# Patient Record
Sex: FEMALE | Race: WHITE | NOT HISPANIC OR LATINO | Employment: OTHER | URBAN - METROPOLITAN AREA
[De-identification: names, ages, dates, MRNs, and addresses within clinical notes are randomized per-mention and may not be internally consistent; named-entity substitution may affect disease eponyms.]

---

## 2021-05-25 ENCOUNTER — RECORDS - HEALTHEAST (OUTPATIENT)
Dept: ADMINISTRATIVE | Facility: CLINIC | Age: 71
End: 2021-05-25

## 2021-05-27 ENCOUNTER — RECORDS - HEALTHEAST (OUTPATIENT)
Dept: ADMINISTRATIVE | Facility: CLINIC | Age: 71
End: 2021-05-27

## 2022-05-06 ENCOUNTER — HOSPITAL ENCOUNTER (EMERGENCY)
Facility: CLINIC | Age: 72
Discharge: ANOTHER HOSPITAL | End: 2022-05-06
Attending: STUDENT IN AN ORGANIZED HEALTH CARE EDUCATION/TRAINING PROGRAM | Admitting: STUDENT IN AN ORGANIZED HEALTH CARE EDUCATION/TRAINING PROGRAM
Payer: COMMERCIAL

## 2022-05-06 VITALS
DIASTOLIC BLOOD PRESSURE: 85 MMHG | TEMPERATURE: 98.5 F | WEIGHT: 149 LBS | HEART RATE: 60 BPM | OXYGEN SATURATION: 94 % | SYSTOLIC BLOOD PRESSURE: 173 MMHG | RESPIRATION RATE: 16 BRPM

## 2022-05-06 DIAGNOSIS — H57.11 EYE PAIN, RIGHT: ICD-10-CM

## 2022-05-06 PROCEDURE — 99285 EMERGENCY DEPT VISIT HI MDM: CPT | Performed by: STUDENT IN AN ORGANIZED HEALTH CARE EDUCATION/TRAINING PROGRAM

## 2022-05-06 RX ORDER — TETRACAINE HYDROCHLORIDE 5 MG/ML
1-2 SOLUTION OPHTHALMIC ONCE
Status: DISCONTINUED | OUTPATIENT
Start: 2022-05-06 | End: 2022-05-06 | Stop reason: CLARIF

## 2022-05-07 ENCOUNTER — HOSPITAL ENCOUNTER (EMERGENCY)
Facility: CLINIC | Age: 72
Discharge: HOME | End: 2022-05-07
Attending: EMERGENCY MEDICINE | Admitting: EMERGENCY MEDICINE
Payer: COMMERCIAL

## 2022-05-07 ENCOUNTER — TELEPHONE (OUTPATIENT)
Dept: OPHTHALMOLOGY | Facility: CLINIC | Age: 72
End: 2022-05-07
Payer: COMMERCIAL

## 2022-05-07 VITALS
HEART RATE: 62 BPM | OXYGEN SATURATION: 96 % | SYSTOLIC BLOOD PRESSURE: 149 MMHG | RESPIRATION RATE: 16 BRPM | TEMPERATURE: 98.4 F | DIASTOLIC BLOOD PRESSURE: 74 MMHG

## 2022-05-07 DIAGNOSIS — H57.11 DISCOMFORT OF RIGHT EYE: ICD-10-CM

## 2022-05-07 PROCEDURE — 99282 EMERGENCY DEPT VISIT SF MDM: CPT | Performed by: EMERGENCY MEDICINE

## 2022-05-07 RX ORDER — PROPARACAINE HYDROCHLORIDE 5 MG/ML
1 SOLUTION/ DROPS OPHTHALMIC ONCE
Status: DISCONTINUED | OUTPATIENT
Start: 2022-05-07 | End: 2022-05-07 | Stop reason: HOSPADM

## 2022-05-07 ASSESSMENT — VISUAL ACUITY: OD: 20/20

## 2022-05-07 NOTE — TELEPHONE ENCOUNTER
I spoke with Sharmaine Neville to follow up with her regarding her ER visit yesterday. She says that when she went to her post-op day 1 appointment for her cataract surgery on Thursday (done by outside ophthalmologist at [facility]) she was told her eye pressure was 42. She was given an eye drop to lower her pressure in the clinic and it decreased to 35. She was prescribed an eye drop (brimonidine) to use twice per day and then advised to call if she had any worsening symptoms. Then yesterday afternoon she developed pain in her left eye and was concerned so she tried to call the number she was given but no one answered so she went to her primary care doctor's office who told her they did not have a way to check the eye pressure so they sent her to the emergency room. She went to [facility] ED who said they also did not have a tonopen. They called [facility] ED who also did not have a tonopen. They they decided to send her to the [facility] for IOP measurement. Her IOP was measured when she got there and it was found to be 18. She did not have any visual complaints and felt like her pain was improving. She was reassured and discharged home and told to continue her brimonidine as scheduled and follow up with her eye doctor on Tuesday as previously scheduled. She says her eye feels back to normal today and she will plan to follow up on Tuesday. I advised her to call us back should she develop recurrent eye pain over the weekend. She verbalized understanding and agreed with the plan.     Mary Kay Melvin MD  Ophthalmology Resident, PGY-2  [facility]

## 2022-05-07 NOTE — ED PROVIDER NOTES
ED Provider Note  [facility]      History   No chief complaint on file.    HPI  Sharmaine Neville is a 71 year old female who presents to the ED with complaint of right eye pain.  On Thursday morning, day and a half ago, she had cataract surgery on the right eye at [facility].  She states that she was told that she might have little bit of discomfort, but to call if she was having pain or pressure in the eye.  She states that she had a follow-up appointment scheduled on Friday morning with an optometrist.  She went to that visit, was told that her pressures were high and that eye (sounds like in the 40s), so the optometrist put some Brimonidine eyedrops in her right eye.  She states that the pressure was rechecked 20 minutes later and had improved down (sounds like in the 30s), so she was told she could discharge home, was given a prescription for the eyedrops, told to use every 4 hours.  However, in the afternoon, she started to feel like she was having intermittent sharp pain through her eye, felt like someone was stabbing her with an ice pack.  She states her vision is good.  No nausea or vomiting.  No fevers.  She tried to reach her doctor but was unable to get through to anyone.  She ended up at [facility], but unfortunately they do not have a Tre-Pen, so they ultimately consulted with our ophthalmologist, as they also were unable to reach the patient's ophthalmologist, and referred her here for further evaluation.  No other complaints aside from the pressure in the right and the intermittent pain that she was having earlier as well.    Dictation Disclaimer: Some of this Note has been completed with voice-recognition dictation software. Although errors are generally corrected real-time, there is the potential for a rare error to be present in the completed chart.      Past Medical History  No past medical history on file.  No past surgical history on file.  No current outpatient  medications on file.    Allergies   Allergen Reactions     Ace Inhibitors      Other reaction(s): Angioedema, Edema  mouth swelling due to vasotec  mouth swelling       Etodolac Hives     Other reaction(s): Hives     Hydrocodone Nausea     Hydrocodone-Acetaminophen Nausea     Iodides Hives and Other (See Comments)     Hives. the patient states that she can have sea foods with no adverse reaction  Hives. the patient states that she can have sea foods with no adverse reaction       Lidocaine Other (See Comments)     Other reaction(s): Trouble Breathing  viscous lidocaine - difficulty breathing  viscous lidocaine - difficulty breathing =>has tolerated injectable lidocaine for IV starts       Oxycodone Nausea     Other reaction(s): Nausea/Vomiting     Quinazolines Other (See Comments)     Other reaction(s): Tachycardia  Terazosin and doxazosin causing Tachycardia.   Terazosin and doxazosin causing Tachycardia.        Simvastatin Other (See Comments)     Other reaction(s): Edema  Severe muscle ache       Family History  No family history on file.  Social History       Past medical history, past surgical history, medications, allergies, family history, and social history were reviewed with the patient. No additional pertinent items.       Review of Systems  A complete review of systems was performed with pertinent positives and negatives noted in the HPI, and all other systems negative.    Physical Exam   BP: (!) 164/73  Pulse: 61  Temp: 98.4  F (36.9  C)  Resp: 16  SpO2: 96 %  Physical Exam  Constitutional:       General: She is not in acute distress.     Appearance: She is not diaphoretic.   HENT:      Head: Atraumatic.      Mouth/Throat:      Pharynx: No oropharyngeal exudate.   Eyes:      General: No scleral icterus.        Right eye: No discharge.         Left eye: No discharge.      Extraocular Movements: Extraocular movements intact.      Conjunctiva/sclera: Conjunctivae normal.      Pupils: Pupils are equal,  round, and reactive to light.      Comments: IOP on the right was 18,19,20 on three checks with tonopen   Cardiovascular:      Heart sounds: Normal heart sounds.   Pulmonary:      Effort: No respiratory distress.      Breath sounds: Normal breath sounds.   Abdominal:      Palpations: Abdomen is soft.      Tenderness: There is no abdominal tenderness.   Musculoskeletal:         General: No tenderness or deformity.         ED Course      Procedures                     No results found for any visits on 05/07/22.  Medications   proparacaine (ALCAINE) 0.5 % ophthalmic solution 1 drop (has no administration in time range)        Assessments & Plan (with Medical Decision Making)   Visual acuity was normal in the right eye.  The eye itself looks normal, has no redness, discharge.  The patient states that her vision seems great.  Her only concern was the pressure/intermittent discomfort feeling she is having.  I did speak with Dr. Melvin, the ophthalmology resident.  She did state it was fine to go ahead and put proparacaine in her eye and check the intraocular pressure.  Pressures on 3 successive checks were 18, 19, 20.  I did speak with Dr. Melvin again, who states that the patient should continue the eyedrop she has been using, follow-up on Monday as planned.  She also asked that the patient be informed that she can call the hospital  and asked for the eye resident to be patient anytime if she has any other questions or concerns.  The patient feels reassured with this information.  She is also told she may return to the ER with any other concerns.  She verbalizes understanding and is agreeable to the plan.    Dictation Disclaimer: Some of this Note has been completed with voice-recognition dictation software. Although errors are generally corrected real-time, there is the potential for a rare error to be present in the completed chart.      I have reviewed the nursing notes. I have reviewed the findings,  diagnosis, plan and need for follow up with the patient.    There are no discharge medications for this patient.      Final diagnoses:   Discomfort of right eye       --  Brenda FERRER [facility] EMERGENCY DEPARTMENT  5/6/2022     Brenda Trujillo MD  05/07/22 0222

## 2022-05-07 NOTE — ED TRIAGE NOTES
Patient arrived ambulatory in triage   Patient reports cataract surgery on 5/5/22.     Patient c/o of intermittent pressure and sharp on right eye.     Patient reports she had a follow up her eye doctor today and sent home with eye drops for increased pressure on her right eye.      Patient reports pain has increase since she seen her doctor earlier today.     Denied changes in vision, no redness, no drainage noted in eye

## 2022-05-07 NOTE — ED PROVIDER NOTES
Emergency Department Encounter         FINAL IMPRESSION:  Postop eye pain        ED COURSE AND MEDICAL DECISION MAKING       ED Course as of 05/06/22 2305   Fri May 06, 2022   2012 Patient is a 71-year-old postop day 1 from a right cataract surgery.  Patient states that her right eyes been hurting.  States it hurts when she moves her eye around.  States that she has lightening type pain on the lateral upper half of her eyelid at times.  No fevers chills nausea vomiting.  Patient states her vision is significantly better after surgery.  Patient states that she was at her postoperative appointment today with an optometrist who did eye pressures.    2227 I talked to Dr. Smith at the emergency [place] who will see patient in the ED.  We unfortunately do not have a Tre-Pen tonight so I cannot check pressures in patient's right eye.          7:20 PM Initial history and physical performed. Plan of care discussed. PPE utilized includes N95 mask, goggles, and gloves.     9:49 PM I spoke with Dr. Melvin, Opthamology.    10:24 PM I spoke with Dr. Smith, ED physician.                 At the conclusion of the encounter I discussed the results of all the tests and the disposition. The questions were answered. The patient or family acknowledged understanding and was agreeable with the care plan.                  MEDICATIONS GIVEN IN THE EMERGENCY DEPARTMENT:  Medications - No data to display    NEW PRESCRIPTIONS STARTED AT TODAY'S ED VISIT:  New Prescriptions    No medications on file       HPI     Patient information obtained from: Patient    Use of Interpretor: N/A     Sharmaine Neville is a 71 year old female with a pertinent history of cataract, essential hypertension, chronic kidney disease stage 3, and DM Type II who presents to this ED by walk in for evaluation of eye pain.    Patient complains of a sudden, shooting right eye pain that is worse when lying flat beginning this morning when she awoke. Patient notes that  she had cataract surgery yesterday, performed by Dr. Bautista, but says she had no pain. When she woke up this morning she said she had a right sided facial pressure. Patient had a post op appointment today, with Dr. Martinez, where her eye pressure was recorded to be 42. Patient was given eye drops and then her eye pressure went down to 35. Patient returned home from her appointment and took another dose of the eye drops but has continued to have pain. Patient denies fever, blurry vision, shortness of breath, chest pain, or any other concerns at this time.    REVIEW OF SYSTEMS:  Review of Systems   Constitutional: Negative for fever, malaise  HEENT: Positive for right eye pain and right sided facial pressure. Negative for blurry vision, runny nose, sore throat, ear pain, neck pain  Respiratory: Negative for shortness of breath, cough, congestion  Cardiovascular: Negative for chest pain, leg edema  Gastrointestinal: Negative for abdominal distention, abdominal pain, constipation, vomiting, nausea, diarrhea  Genitourinary: Negative for dysuria and hematuria.   Integument: Negative for rash, skin breakdown  Neurological: Negative for paresthesias, weakness, headache.  Musculoskeletal: Negative for joint pain, joint swelling      All other systems reviewed and are negative.          MEDICAL HISTORY     No past medical history on file.    No past surgical history on file.         No current outpatient medications on file.          PHYSICAL EXAM     BP (!) 177/90   Temp 98.5  F (36.9  C)   Resp 16   Wt 67.6 kg (149 lb)   SpO2 95%       PHYSICAL EXAM:     General: Patient appears well, nontoxic, comfortable  HEENT: Moist mucous membranes, no tongue swelling.  No head trauma.  No midline neck pain.  Bilateral eyes normal.  PERRLA.  No significant redness around the right eye.  No scleral injection.  Forage motion of the eye.  Normal palpebra.  Cardiovascular: Normal rate, normal rhythm, no extremity edema.  No appreciable  murmur.  Respiratory: No signs of respiratory distress, lungs are clear to auscultation bilaterally with no wheezes rhonchi or rales.    Neurological: Alert and oriented, grossly neurologically intact.  Psychological: Normal affect and mood.  Integument: No rashes appreciated          RESULTS       Labs Ordered and Resulted from Time of ED Arrival to Time of ED Departure - No data to display    No orders to display                     PROCEDURES:  Procedures:  Procedures       I, Herbert Mitchell am serving as a scribe to document services personally performed by Quincy Mckenna DO, based on my observations and the provider's statements to me.  I, Quincy Mckenna DO, attest that Herbert Mitchell is acting in a scribe capacity, has observed my performance of the services and has documented them in accordance with my direction.    Quincy Mckenna DO  Emergency Medicine  [facility] EMERGENCY ROOM      Quincy Mckenna DO  05/06/22 [id number]

## 2022-05-07 NOTE — DISCHARGE INSTRUCTIONS
Continue with your eye drops, and follow up on Monday, as planned. If you have any new concerns prior to Monday, you can call the hospital number at [phone] and ask for the Eye resident to be paged. You can also return to the ER with any concerns.